# Patient Record
Sex: MALE | Race: WHITE
[De-identification: names, ages, dates, MRNs, and addresses within clinical notes are randomized per-mention and may not be internally consistent; named-entity substitution may affect disease eponyms.]

---

## 2020-03-03 ENCOUNTER — HOSPITAL ENCOUNTER (OUTPATIENT)
Dept: HOSPITAL 74 - FASU-ENDO | Age: 60
Discharge: HOME | End: 2020-03-03
Attending: INTERNAL MEDICINE
Payer: COMMERCIAL

## 2020-03-03 VITALS — DIASTOLIC BLOOD PRESSURE: 66 MMHG | HEART RATE: 59 BPM | SYSTOLIC BLOOD PRESSURE: 121 MMHG

## 2020-03-03 VITALS — BODY MASS INDEX: 28.5 KG/M2

## 2020-03-03 VITALS — TEMPERATURE: 97.9 F

## 2020-03-03 DIAGNOSIS — K29.50: ICD-10-CM

## 2020-03-03 DIAGNOSIS — R13.10: ICD-10-CM

## 2020-03-03 DIAGNOSIS — K22.70: Primary | ICD-10-CM

## 2020-03-03 DIAGNOSIS — Z87.19: ICD-10-CM

## 2020-03-03 PROCEDURE — 0DB68ZX EXCISION OF STOMACH, VIA NATURAL OR ARTIFICIAL OPENING ENDOSCOPIC, DIAGNOSTIC: ICD-10-PCS | Performed by: INTERNAL MEDICINE

## 2020-03-03 PROCEDURE — 0DB58ZX EXCISION OF ESOPHAGUS, VIA NATURAL OR ARTIFICIAL OPENING ENDOSCOPIC, DIAGNOSTIC: ICD-10-PCS | Performed by: INTERNAL MEDICINE

## 2020-03-05 NOTE — PATH
Surgical Pathology Report



Patient Name:  LUISA GRANDA

Accession #:  

Med. Rec. #:  [id number]                                                        

   /Age/Gender:  1960 (Age: 59) / M

Account:  [id number]                                                          

             Location: [facility]

Taken:  3/3/2020

Received:  3/3/2020

Reported:  3/5/2020

Physicians:  Yosef Boyd M.D.

  



Specimen(s) Received

A: BX STOMACH 

B: BX ESOPHAGUS 





Clinical History

Dysphagia, history of Aaron's

Postoperative diagnosis: Gastritis, irregular Z line







Final Diagnosis

A. STOMACH, BIOPSY:

MILD CHRONIC GASTRITIS.

IMMUNOSTAIN IS NEGATIVE FOR H PYLORI ORGANISMS.



B. ESOPHAGUS, BIOPSY:

ESOPHAGOGASTRIC JUNCTIONAL (SQUAMOCOLUMNAR) MUCOSA SHOWING FOCAL INTESTINAL

METAPLASIA AND MODERATE CHRONIC INFLAMMATION, COMPATIBLE WITH AARON'S

ESOPHAGUS IN CONJUNCTION WITH APPROPRIATE ENDOSCOPIC FINDINGS.

NEGATIVE FOR DYSPLASIA.





***Electronically Signed***

Tamanna Meeks M.D.





Gross Description

A.  Received in formalin, labeled "biopsy stomach" are 2 tan, irregular portions

of soft tissue measuring 0.2 and 0.4 cm. in greatest dimension. The specimens

are submitted in toto in one cassette.



B.  Received in formalin, labeled "biopsy esophagus" are 3 tan, irregular

portions of soft tissue ranging from 0.2-0.3 cm. in greatest dimension. The

specimens are submitted in toto in one cassette.

/3/4/2020



saudi/3/4/2020